# Patient Record
Sex: FEMALE | Race: WHITE
[De-identification: names, ages, dates, MRNs, and addresses within clinical notes are randomized per-mention and may not be internally consistent; named-entity substitution may affect disease eponyms.]

---

## 2018-03-07 ENCOUNTER — HOSPITAL ENCOUNTER (EMERGENCY)
Dept: HOSPITAL 17 - NEPD | Age: 43
Discharge: HOME | End: 2018-03-07
Payer: COMMERCIAL

## 2018-03-07 VITALS
HEART RATE: 97 BPM | TEMPERATURE: 98.3 F | DIASTOLIC BLOOD PRESSURE: 67 MMHG | OXYGEN SATURATION: 99 % | SYSTOLIC BLOOD PRESSURE: 152 MMHG | RESPIRATION RATE: 16 BRPM
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
## 2018-03-07 DIAGNOSIS — T15.90XA: Primary | ICD-10-CM

## 2018-03-07 DIAGNOSIS — Z77.21: ICD-10-CM

## 2018-03-07 PROCEDURE — 99283 EMERGENCY DEPT VISIT LOW MDM: CPT

## 2018-03-07 NOTE — PD
HPI


Chief Complaint:  Exposure to Blood/Body Fluids


Time Seen by Provider:  17:48


Travel History


International Travel<30 days:  No


Contact w/Intl Traveler<30days:  No


Traveled to known affect area:  No





History of Present Illness


HPI


43-year-old female came to the emergency room with history of being exposed to 

body fluid at her work.  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 

specimen during endoscopy.  It was a polyp that was put in a formalin cup for 

biopsy.  They were trying to detach the polyp from the instrument when the 

fluid splashed into her eye.  Patient wears glasses but somehow dropped managed 

to get into the eye.  This happened around 1:30 PM.  Patient washed her face 

and I vigorously for 15 minutes with water.  Apparently the the initial patient'

s HIV status was checked and our nurse was told that it was negative.  Vital 

signs are stable.  She otherwise feels okay.





[facility]


Past Medical History


*** Narrative Medical


List of her past medical, surgical, social and family history is reviewed from 

the nursing note.


Pregnant?:  Unknown





Social History


Alcohol Use:  No


Tobacco Use:  No


Substance Use:  No





Allergies-Medications


(Allergen,Severity, Reaction):  


Coded Allergies:  


     No Known Allergies (Unverified , 3/7/18)


Comments


No known drug allergies


Narrative Medication


Awaiting for the nurse to do the med reconciliation





Review of Systems


Except as stated in HPI:  all other systems reviewed are Neg





Physical Exam


Narrative


GENERAL: Awake, alert, no obvious distress


SKIN: Focused skin assessment warm/dry.


HEAD: Atraumatic. Normocephalic. 


EYES: Pupils equal and round. No scleral icterus. No injection or drainage. 


ENT: No nasal bleeding or discharge.  Mucous membranes pink and moist.


NECK: Trachea midline. No JVD. 


CARDIOVASCULAR: Regular rate and rhythm.  No murmur appreciated.


RESPIRATORY: No accessory muscle use. Clear to auscultation. Breath sounds 

equal bilaterally. 


GASTROINTESTINAL: Abdomen soft, non-tender, nondistended. Hepatic and splenic 

margins not palpable. 


MUSCULOSKELETAL: No obvious deformities. No clubbing.  No cyanosis.  No edema. 


NEUROLOGICAL: Awake and alert. No obvious cranial nerve deficits.  Motor 

grossly within normal limits. Normal speech.


PSYCHIATRIC: Appropriate mood and affect; insight and judgment normal.





Data


Data


Last Documented VS





Vital Signs








  Date Time  Temp Pulse Resp B/P (MAP) Pulse Ox O2 Delivery O2 Flow Rate FiO2


 


3/7/18 15:57 98.3 97 16 152/67 (95) 99   








Orders





 Orders


Hiv 1 2 Ab Differentiation (3/7/18 17:56)


Hepatitis Profile (3/7/18 17:56)


Ed Discharge Order (3/7/18 18:01)








MDM


Medical Decision Making


Medical Screen Exam Complete:  Yes


Emergency Medical Condition:  Yes


Medical Record Reviewed:  Yes


Differential Diagnosis


Body fluid exposure


Narrative Course


6:05 PM I tried explaining to the patient that the patient was body fluid she 

was exposed to was tested HIV negative in which case chance of her acquiring 

HIV is close to 0% even if a seroconversion does happen and the patient later.  

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  I recommended 

only an HIV screening along with hepatitis profile mainly for hep C.  In my 

opinion she does not require postexposure prophylaxis.  Patient is okay with 

that plan.  She will need to follow-up with employee med after this.  This has 

been explained to her and also put in the discharge instruction.





Procedures


EKG Prior to Arrival:  No





Diagnosis





 Primary Impression:  


 Employee exposure to body fluids


Referrals:  


Employ Med


1 day





***Additional Instructions:  


Please call employee med tomorrow and get instructions on the follow-up date 

for repeat tests for HIV.  They should be able to tell you the test results 

that were performed today and guide you further.


***Med/Other Pt SpecificInfo:  No Change to Meds


Disposition:  01 DISCHARGE HOME


Condition:  Leatha Rosales MD Mar 7, 2018 17:52